# Patient Record
Sex: FEMALE | Race: WHITE | NOT HISPANIC OR LATINO | URBAN - METROPOLITAN AREA
[De-identification: names, ages, dates, MRNs, and addresses within clinical notes are randomized per-mention and may not be internally consistent; named-entity substitution may affect disease eponyms.]

---

## 2023-10-13 PROBLEM — H66.91 RIGHT OTITIS MEDIA: Status: ACTIVE | Noted: 2023-10-13

## 2023-10-13 PROBLEM — R55 SYNCOPE: Status: ACTIVE | Noted: 2023-10-13

## 2023-10-13 PROBLEM — F41.9 ANXIETY: Status: ACTIVE | Noted: 2023-10-13

## 2023-10-13 RX ORDER — BIOTIN 1 MG
TABLET ORAL
COMMUNITY

## 2023-10-13 RX ORDER — LEVOTHYROXINE SODIUM 100 UG/1
TABLET ORAL
COMMUNITY
Start: 2016-06-04

## 2023-10-13 RX ORDER — INSULIN LISPRO 100 [IU]/ML
INJECTION, SOLUTION INTRAVENOUS; SUBCUTANEOUS
COMMUNITY
Start: 2016-03-22

## 2023-10-13 RX ORDER — LEVOTHYROXINE SODIUM 112 UG/1
TABLET ORAL
COMMUNITY

## 2023-10-13 RX ORDER — ETONOGESTREL AND ETHINYL ESTRADIOL VAGINAL RING .015; .12 MG/D; MG/D
1 RING VAGINAL
COMMUNITY
Start: 2019-11-08

## 2023-10-13 RX ORDER — ESCITALOPRAM OXALATE 10 MG/1
10 TABLET ORAL DAILY
COMMUNITY
Start: 2020-03-06 | End: 2024-01-02

## 2023-10-13 RX ORDER — ACETAMINOPHEN 500 MG
50 TABLET ORAL DAILY
COMMUNITY
Start: 2020-10-16

## 2023-10-13 RX ORDER — INSULIN DETEMIR 100 [IU]/ML
INJECTION, SOLUTION SUBCUTANEOUS
COMMUNITY
Start: 2016-11-10

## 2023-10-16 ENCOUNTER — OFFICE VISIT (OUTPATIENT)
Dept: ORTHOPEDIC SURGERY | Facility: CLINIC | Age: 38
End: 2023-10-16
Payer: COMMERCIAL

## 2023-10-16 ENCOUNTER — ANCILLARY PROCEDURE (OUTPATIENT)
Dept: RADIOLOGY | Facility: CLINIC | Age: 38
End: 2023-10-16
Payer: COMMERCIAL

## 2023-10-16 DIAGNOSIS — M25.512 ACUTE PAIN OF LEFT SHOULDER: ICD-10-CM

## 2023-10-16 DIAGNOSIS — M75.42 IMPINGEMENT SYNDROME OF LEFT SHOULDER: ICD-10-CM

## 2023-10-16 PROCEDURE — 73030 X-RAY EXAM OF SHOULDER: CPT | Mod: LEFT SIDE | Performed by: RADIOLOGY

## 2023-10-16 PROCEDURE — 73030 X-RAY EXAM OF SHOULDER: CPT | Mod: LT,FY

## 2023-10-16 PROCEDURE — 99203 OFFICE O/P NEW LOW 30 MIN: CPT | Performed by: ORTHOPAEDIC SURGERY

## 2023-10-16 RX ORDER — MELOXICAM 15 MG/1
15 TABLET ORAL DAILY
Qty: 30 TABLET | Refills: 0 | Status: SHIPPED | OUTPATIENT
Start: 2023-10-16

## 2023-10-16 NOTE — PROGRESS NOTES
History of Present Illness:  Patient presents today endorsing left shoulder pain.  The pain is worse with overhead activity and tends to wake the patient at night.  The patient denies a traumatic injury.  The pain is sharp in nature, localizes lateral and deep.  Better with rest.  She works as a nurse began to play volleyball recently and attributes some of the discomfort to that.      No past medical history on file.  No past surgical history on file.    Current Outpatient Medications:     biotin 1 mg tablet, Take by mouth., Disp: , Rfl:     cholecalciferol (Vitamin D-3) 50 mcg (2,000 unit) capsule, Take 1 capsule (50 mcg) by mouth early in the morning.., Disp: , Rfl:     escitalopram (Lexapro) 10 mg tablet, Take 1 tablet (10 mg) by mouth once daily., Disp: , Rfl:     etonogestreL-ethinyl estradioL (NuvaRing) 0.12-0.015 mg/24 hr vaginal ring, Insert 1 each into the vagina.  for 3 weeks then remove for 1 week then repeat, Disp: , Rfl:     insulin detemir (Levemir FlexTouch U100 Insulin) 100 unit/mL (3 mL) pen, Inject under the skin., Disp: , Rfl:     insulin lispro (HumaLOG KwikPen Insulin) 100 unit/mL injection, Inject under the skin., Disp: , Rfl:     levothyroxine (Synthroid) 112 mcg tablet, Take by mouth. 1 by mouth every day: 1.5 On Sumdays LT4 112/D -Increased By DR Justin Dailye To 1.5 Tabs/Sunday, Disp: , Rfl:     levothyroxine (Synthroid, Levoxyl) 100 mcg tablet, Take by mouth., Disp: , Rfl:     meloxicam (Mobic) 15 mg tablet, Take 1 tablet (15 mg) by mouth once daily., Disp: 30 tablet, Rfl: 0    Review of Systems   GENERAL: Negative for malaise, significant weight loss, fever  MUSCULOSKELETAL: see HPI  NEURO:  Negative    Physical Examination:  Right Shoulder:    Skin healthy to gross inspection  No ecchymosis, no swelling, no gross atrophy  No tenderness to palpation over acromioclavicular joint  No tenderness to palpation over biceps tendon  No tenderness to palpation over the cervical spine     Range of  motion:  Full forward flexion  Full external rotation  IR to thoracic spine, symmetric to contralateral side  Strength:  4+/5 Resisted elevation  5/5 Resisted external rotation    Negative lift off test   Negative Spurling´s test  Positive Neer and Hawkin´s test  Neurovascular exam normal distally    Imaging:  Radiographs demonstrate healthy joint spaces with no evidence of significant degenerative changes or fractures    Assessment:  Patient with right shoulder pain, impingement versus rotator cuff pathology      Plan:  We discussed external impingement - reviewing acromial morphology and rotator cuff pathology.  The possibility of a partial or full-thickness or partial rotator cuff tear was discussed.  We discussed non-operative treatments (physical therapy, NSAIDs/risks, corticosteroid injections, and activity medication) and operative treatments (shoulder arthroscopy, acromioplasty, associated interventions, risks and benefits).  The patient elected for prescription strength anti-inflammatory, physical therapy.  MRI if she fails to improve..

## 2023-12-06 ENCOUNTER — EVALUATION (OUTPATIENT)
Dept: PHYSICAL THERAPY | Facility: CLINIC | Age: 38
End: 2023-12-06
Payer: COMMERCIAL

## 2023-12-06 DIAGNOSIS — M75.42 IMPINGEMENT SYNDROME OF LEFT SHOULDER: ICD-10-CM

## 2023-12-06 DIAGNOSIS — M25.612 SHOULDER STIFFNESS, LEFT: ICD-10-CM

## 2023-12-06 DIAGNOSIS — M25.512 LEFT SHOULDER PAIN, UNSPECIFIED CHRONICITY: Primary | ICD-10-CM

## 2023-12-06 PROCEDURE — 97161 PT EVAL LOW COMPLEX 20 MIN: CPT | Mod: GP | Performed by: PHYSICAL THERAPIST

## 2023-12-06 PROCEDURE — 97110 THERAPEUTIC EXERCISES: CPT | Mod: GP | Performed by: PHYSICAL THERAPIST

## 2023-12-06 ASSESSMENT — PAIN DESCRIPTION - DESCRIPTORS: DESCRIPTORS: ACHING

## 2023-12-06 ASSESSMENT — PAIN - FUNCTIONAL ASSESSMENT: PAIN_FUNCTIONAL_ASSESSMENT: 0-10

## 2023-12-06 ASSESSMENT — PAIN SCALES - GENERAL: PAINLEVEL_OUTOF10: 5 - MODERATE PAIN

## 2023-12-06 NOTE — PROGRESS NOTES
Physical Therapy    Physical Therapy Upper Extremity Evaluation    Patient Name: Yvonne Echeverria  MRN: [id number]  Today's Date: 12/6/2023  Time Calculation  Start Time: 1700  Stop Time: 1745  Time Calculation (min): 45 min    Current Problem  Problem List Items Addressed This Visit             ICD-10-CM    Left shoulder pain - Primary M25.512    Relevant Orders    Follow Up In Physical Therapy    Shoulder stiffness, left M25.612    Relevant Orders    Follow Up In Physical Therapy    Impingement syndrome of left shoulder M75.42    Relevant Orders    Follow Up In Physical Therapy        Precautions  Precautions  Precautions Comment: none     Pain  Pain Assessment: 0-10  Pain Score: 5 - Moderate pain  Pain Location: Shoulder  Pain Orientation: Left  Pain Descriptors: Aching  Pain Frequency: Intermittent    SUBJECTIVE:   Chief complaint:  Pt reports she had an onset of L shoulder pain after playing volleyball in late summer 2023. Notes pain with movement. Notes no pain at rest. Notes tightness in the shoulder and difficulty reaching/lifting/laying on the shoulder. Notes some weakness in the arm at this time. No prior hx of shoulder pain    Pain Better: rest, heat    Pain Worse: laying on it to sleep, reaching, lifting     Imaging: x-rays were negative     Prior level of function: was previously independent with reaching and lifting L UE and with all prior ADL's. R handed   Current limitations: lifting, reaching, laying on L shoulder     Home setup:reviewed an no concern     Work: nurse    Patients goal: better motion, reduce pain     Prior tx: no prior TX this yr.     OBJECTIVE:  Upper Extremity Strength:  MMT 5/5 max  RIGHT LEFT   Shoulder Flexion 5/5 4/5   Shoulder Abduction 5/5 4/5   Shoulder ER 5/5 4/5   Shoulder IR 5/5 4+/5   Elbow Flexion 5/5 5/5   Elbow Extension 5/5 5/5   Wrist Flexion     Wrist Extension       Upper Extremity ROM: WNL unless documented below:  ROM in Degrees RIGHT LEFT   Shoulder Flexion 172 128    Shoulder Abduction 170 104   Shoulder ER 70 52   Shoulder IR 65 40   Elbow Flexion WNL WNL   Elbow Extension WNL WNL   Wrist Flexion     Wrist Extension       Posture: forward rounded shoulders  Palpation:Mild tenderness to palpation anterior shoulder    Other Measures  Disability of Arm Shoulder Hand (DASH): Qdash 29.55    Special tests:  SHOULDER RIGHT LEFT   Hawkin's-Richard neg pos   Neer Impingement  neg pos   Drop arm neg neg   Infraspinatus muscle test neg pos   Lateral holger test neg pos   Empty can  neg Pos      TREATMENT:  Eval  2. Instruction in HEP:   Access Code: [email or web address]  URL: [email or web address]  Date: 12/06/2023  Prepared by:  Yovana    Exercises  - Shoulder Flexion Wall Slide with Towel (Mirrored)  - 1 x daily - 7 x weekly - 1 sets - 10 reps - 5 sec  hold  - Seated Shoulder Flexion Towel Slide at Table Top (Mirrored)  - 1 x daily - 7 x weekly - 1 sets - 10 reps - 5 sec  hold  - Seated Shoulder External Rotation PROM on Table  - 1 x daily - 7 x weekly - 1 sets - 10 reps - 5 sec  hold  - Seated Scapular Retraction  - 1 x daily - 7 x weekly - 2 sets - 10 reps  - Standing Shoulder Row with Anchored Resistance  - 1 x daily - 7 x weekly - 2 sets - 10 reps  - Shoulder extension with resistance - Neutral  - 1 x daily - 7 x weekly - 2 sets - 10 reps    ASSESSEMENT  Pt is a 38 y.o. referred to physical therapy for a dx of impingement syndrome L shoulder by Manny Almonte MD . Pt with signs and symptoms of pain, loss of motion, loss of strength, reduced function and reduced posture. This pt would benefit from a therapy program to restore prior level of function, reduce pain, increase AROM, increase strength, and improve posture.    The physical therapy prognosis is good for the patient to achieve their goals.   The pt tolerated therapy treatment today well with no adverse effects.  Barriers to therapy include:  Pt is diabetic Type 1    PLAN  The pt will be seen 1-2 days a week  for 6 weeks.      The pt has been educated about the risks and benefits of physical therapy including manual therapy treatments and gives consent for treatment.     The patient will benefit from physical therapy treatment to include: therapeutic exercises, therapeutic activities, neurological re-education, manual therapy, modalities, and a home exercise program.     Goals:  Active       PT Problem       Reduce pain at worst to 1-2/10 with all prior activity.        Start:  12/06/23    Expected End:  12/21/23            Pt to sit with good posture approx 50-75% of the time.        Start:  12/06/23    Expected End:  01/17/24            Pt to have increased shoulder and scapular strength by 1/2 to full MMT grade for improved ADL's, lifting and postural support.        Start:  12/06/23    Expected End:  01/17/24            Reduce pain at worst to 0/10 with all prior activity.        Start:  12/06/23    Expected End:  01/17/24            Increase shoulder AROM (in degrees) flex to 165, Abd 165, ER 90, IR 65 for lifting, ADL's, reaching and self care.        Start:  12/06/23    Expected End:  01/17/24            Pt to decrease quick DASH score by 10-15% to display overall improved shoulder function.        Start:  12/06/23    Expected End:  01/17/24            Pt to be independent with a HEP for self management.        Start:  12/06/23    Expected End:  01/17/24

## 2023-12-11 ENCOUNTER — TREATMENT (OUTPATIENT)
Dept: PHYSICAL THERAPY | Facility: CLINIC | Age: 38
End: 2023-12-11
Payer: COMMERCIAL

## 2023-12-11 DIAGNOSIS — M75.42 IMPINGEMENT SYNDROME OF LEFT SHOULDER: ICD-10-CM

## 2023-12-11 DIAGNOSIS — M25.612 SHOULDER STIFFNESS, LEFT: ICD-10-CM

## 2023-12-11 DIAGNOSIS — M25.512 LEFT SHOULDER PAIN, UNSPECIFIED CHRONICITY: ICD-10-CM

## 2023-12-11 PROCEDURE — 97110 THERAPEUTIC EXERCISES: CPT | Mod: GP,CQ

## 2023-12-11 PROCEDURE — 97140 MANUAL THERAPY 1/> REGIONS: CPT | Mod: GP,CQ

## 2023-12-11 ASSESSMENT — PAIN - FUNCTIONAL ASSESSMENT: PAIN_FUNCTIONAL_ASSESSMENT: 0-10

## 2023-12-11 ASSESSMENT — PAIN DESCRIPTION - DESCRIPTORS: DESCRIPTORS: ACHING

## 2023-12-11 ASSESSMENT — PAIN SCALES - GENERAL: PAINLEVEL_OUTOF10: 3

## 2023-12-11 NOTE — PROGRESS NOTES
Physical Therapy Treatment    Patient Name: Yvonne Echeverria  MRN: [id number]  Today's Date: 12/11/2023  Time Calculation  Start Time: 1705  Stop Time: 1745  Time Calculation (min): 40 min    Current Problem:  Problem List Items Addressed This Visit             ICD-10-CM    Left shoulder pain M25.512    Shoulder stiffness, left M25.612    Impingement syndrome of left shoulder M75.42       Subjective   General:   Pt reports shoulder is a bit more achy today. She performed HEP last night so that may be why.     Pain:  Pain Assessment: 0-10  Pain Score: 3  Pain Location: Shoulder  Pain Orientation: Left  Pain Descriptors: Aching  Pain Frequency: Intermittent    Precautions:  Precautions  Precautions Comment: none      Objective   No objective measures taken this visit    Treatment:  Therapeutic exercise  Pulley's (L) flex, scap x2 min ea   L IR with towel roll 5 sec x 10   Pec stretch in doorway 2 x 30 sec   L shoulder IR GTB 2x10   BL shoulder ER RTB 2x10   S/L L shoulder flex 2x10   S/L L shoulder abd 2x10  S/L L shoulder ER 2x10 2#  3 way posture iso 10 sec  x 3     Neuromuscular Re-education       Manual   PROM L shoulder all directions, GH mobs grade I-II   STM/TPR to L pec, ant delt    Modalities  Will ice at home if needed    Assessment   Pt tolerated therex well without complaints of pain, just fatigue.   Significant tightness in pecs, shoulders with 3 way posture. Pt not able to touch wall with UE's d/t tightness.   Significant tightness along anterior delt/pec with manual tx. Positive response with pt voicing relief with shoulder AROM following. Advised pt to ice if needed for residual soreness post tx.     Plan    Continue to progress POC as tolerated by patient to improve strength, mobility and overall function  Update HEP next visit  Goals:  Active       PT Problem       Reduce pain at worst to 1-2/10 with all prior activity.        Start:  12/06/23    Expected End:  12/21/23            Pt to sit with good  posture approx 50-75% of the time.        Start:  12/06/23    Expected End:  01/17/24            Pt to have increased shoulder and scapular strength by 1/2 to full MMT grade for improved ADL's, lifting and postural support.        Start:  12/06/23    Expected End:  01/17/24            Reduce pain at worst to 0/10 with all prior activity.        Start:  12/06/23    Expected End:  01/17/24            Increase shoulder AROM (in degrees) flex to 165, Abd 165, ER 90, IR 65 for lifting, ADL's, reaching and self care.        Start:  12/06/23    Expected End:  01/17/24            Pt to decrease quick DASH score by 10-15% to display overall improved shoulder function.        Start:  12/06/23    Expected End:  01/17/24            Pt to be independent with a HEP for self management.        Start:  12/06/23    Expected End:  01/17/24

## 2023-12-18 ENCOUNTER — TREATMENT (OUTPATIENT)
Dept: PHYSICAL THERAPY | Facility: CLINIC | Age: 38
End: 2023-12-18
Payer: COMMERCIAL

## 2023-12-18 DIAGNOSIS — M75.42 IMPINGEMENT SYNDROME OF LEFT SHOULDER: ICD-10-CM

## 2023-12-18 DIAGNOSIS — M25.612 SHOULDER STIFFNESS, LEFT: ICD-10-CM

## 2023-12-18 DIAGNOSIS — M25.512 LEFT SHOULDER PAIN, UNSPECIFIED CHRONICITY: ICD-10-CM

## 2023-12-18 PROCEDURE — 97140 MANUAL THERAPY 1/> REGIONS: CPT | Mod: GP,CQ

## 2023-12-18 PROCEDURE — 97110 THERAPEUTIC EXERCISES: CPT | Mod: GP,CQ

## 2023-12-18 ASSESSMENT — PAIN - FUNCTIONAL ASSESSMENT: PAIN_FUNCTIONAL_ASSESSMENT: 0-10

## 2023-12-18 ASSESSMENT — PAIN DESCRIPTION - DESCRIPTORS: DESCRIPTORS: ACHING

## 2023-12-18 NOTE — PROGRESS NOTES
Physical Therapy Treatment    Patient Name: Yvonne Echeverria  MRN: [id number]  Today's Date: 12/18/2023  Time Calculation  Start Time: 1700  Stop Time: 1750  Time Calculation (min): 50 min    Current Problem:  Problem List Items Addressed This Visit             ICD-10-CM    Left shoulder pain M25.512    Shoulder stiffness, left M25.612    Impingement syndrome of left shoulder M75.42       Subjective   General:   Pt's daughter jumped and pulled her arm back  and she experienced nauseating pain for about 10 min on Saturday. She had some lingering soreness. Overall still feeling overall improvement in shoulder.   Still limited with reaching behind her back.  She was sore after last session but it felt better the next day.     Pain:  Pain Assessment: 0-10  Pain Location: Shoulder  Pain Orientation: Left  Pain Descriptors: Aching  Pain Frequency: Intermittent    Precautions:  Precautions  Precautions Comment: none      Objective   No objective measures taken this visit    Treatment:  Therapeutic exercise  UBE x5 min fwd/retro   Pulley's (L) scap, IR  x2 min ea   L sleeper stretch 10 sec x 5   Pec stretch on GSB 2 x 30 sec   L shoulder IR GTB 2x10   BL shoulder ER RTB 2x10 with towel rolls for form   S/L L shoulder flex 2x10   S/L L shoulder abd 2x10  S/L L shoulder ER 2x10 2#  3 way posture iso 10 sec  x 3     Neuromuscular Re-education       Manual   PROM L shoulder all directions, GH mobs grade I-II -held  STM/TPR to L levator scap, supraspinatus    Modalities  CP to L shoulder x5 min (not billed)  Skin intact     Assessment   Pt tolerated therex well without complaints of pain, just fatigue.   Significant tightness in pecs, shoulders with 3 way posture. Pt not able to touch wall with UE's d/t tightness.   Significant tightness along anterior delt/pec with manual tx. Positive response with pt voicing relief with shoulder AROM following. Advised pt to ice if needed for residual soreness post tx.     Pt continues to display  significant tightness in pecs/shoulders with 3 way posture but noted some improvement vs previous session.   Improvement in IR AROM, able to bring L UE more midline vs previous session.   Positive response to manual, voicing less tightness at end range AROM.     Plan    Continue to progress POC as tolerated by patient to improve strength, mobility and overall function  Update HEP next visit  Goals:  Active       PT Problem       Reduce pain at worst to 1-2/10 with all prior activity.        Start:  12/06/23    Expected End:  12/21/23            Pt to sit with good posture approx 50-75% of the time.        Start:  12/06/23    Expected End:  01/17/24            Pt to have increased shoulder and scapular strength by 1/2 to full MMT grade for improved ADL's, lifting and postural support.        Start:  12/06/23    Expected End:  01/17/24            Reduce pain at worst to 0/10 with all prior activity.        Start:  12/06/23    Expected End:  01/17/24            Increase shoulder AROM (in degrees) flex to 165, Abd 165, ER 90, IR 65 for lifting, ADL's, reaching and self care.        Start:  12/06/23    Expected End:  01/17/24            Pt to decrease quick DASH score by 10-15% to display overall improved shoulder function.        Start:  12/06/23    Expected End:  01/17/24            Pt to be independent with a HEP for self management.        Start:  12/06/23    Expected End:  01/17/24

## 2023-12-27 ENCOUNTER — APPOINTMENT (OUTPATIENT)
Dept: PHYSICAL THERAPY | Facility: CLINIC | Age: 38
End: 2023-12-27
Payer: COMMERCIAL

## 2024-01-03 ENCOUNTER — OFFICE VISIT (OUTPATIENT)
Dept: PRIMARY CARE | Facility: CLINIC | Age: 39
End: 2024-01-03
Payer: COMMERCIAL

## 2024-01-03 ENCOUNTER — APPOINTMENT (OUTPATIENT)
Dept: PHYSICAL THERAPY | Facility: CLINIC | Age: 39
End: 2024-01-03
Payer: COMMERCIAL

## 2024-01-03 VITALS
OXYGEN SATURATION: 97 % | RESPIRATION RATE: 16 BRPM | SYSTOLIC BLOOD PRESSURE: 110 MMHG | DIASTOLIC BLOOD PRESSURE: 70 MMHG | TEMPERATURE: 96.6 F | HEART RATE: 80 BPM

## 2024-01-03 DIAGNOSIS — B37.31 VAGINAL YEAST INFECTION: ICD-10-CM

## 2024-01-03 DIAGNOSIS — E10.9 TYPE 1 DIABETES MELLITUS ON INSULIN THERAPY (MULTI): ICD-10-CM

## 2024-01-03 DIAGNOSIS — Z30.41 ORAL CONTRACEPTIVE USE: ICD-10-CM

## 2024-01-03 DIAGNOSIS — E55.9 VITAMIN D DEFICIENCY: ICD-10-CM

## 2024-01-03 DIAGNOSIS — H66.001 NON-RECURRENT ACUTE SUPPURATIVE OTITIS MEDIA OF RIGHT EAR WITHOUT SPONTANEOUS RUPTURE OF TYMPANIC MEMBRANE: Primary | ICD-10-CM

## 2024-01-03 DIAGNOSIS — Z78.9 USES BIRTH CONTROL: ICD-10-CM

## 2024-01-03 PROCEDURE — 99214 OFFICE O/P EST MOD 30 MIN: CPT | Performed by: FAMILY MEDICINE

## 2024-01-03 PROCEDURE — 3074F SYST BP LT 130 MM HG: CPT | Performed by: FAMILY MEDICINE

## 2024-01-03 PROCEDURE — 3078F DIAST BP <80 MM HG: CPT | Performed by: FAMILY MEDICINE

## 2024-01-03 RX ORDER — FLUCONAZOLE 150 MG/1
150 TABLET ORAL ONCE
Qty: 1 TABLET | Refills: 0 | Status: SHIPPED | OUTPATIENT
Start: 2024-01-03 | End: 2024-01-03

## 2024-01-03 RX ORDER — DOXYCYCLINE 100 MG/1
100 CAPSULE ORAL 2 TIMES DAILY
Qty: 28 CAPSULE | Refills: 0 | Status: SHIPPED | OUTPATIENT
Start: 2024-01-03 | End: 2024-01-17

## 2024-01-03 NOTE — PATIENT INSTRUCTIONS
KEEP ON INSULIN AND TAKE DOXYCYCLINE FOR RT EAR INFECTION.    FOR YEAST SYMPTOMS PLEASE USE FLUCONAZOLE.  CALL FOR WORSE SYMPTOMS:  [phone].    GET LABS DONE OR FORWARD FROM ENDOCRINOLOGY.   CAUTION ON HORMONE CONTROL AND ANTIBIOTICS CAN OVULATE.  USE SECONDARY PROTECTION OR ABSTAIN.

## 2024-01-08 ENCOUNTER — TREATMENT (OUTPATIENT)
Dept: PHYSICAL THERAPY | Facility: CLINIC | Age: 39
End: 2024-01-08
Payer: COMMERCIAL

## 2024-01-08 DIAGNOSIS — M25.612 SHOULDER STIFFNESS, LEFT: ICD-10-CM

## 2024-01-08 DIAGNOSIS — M75.42 IMPINGEMENT SYNDROME OF LEFT SHOULDER: Primary | ICD-10-CM

## 2024-01-08 DIAGNOSIS — M25.512 LEFT SHOULDER PAIN, UNSPECIFIED CHRONICITY: ICD-10-CM

## 2024-01-08 PROCEDURE — 97110 THERAPEUTIC EXERCISES: CPT | Mod: GP,CQ

## 2024-01-08 PROCEDURE — 97140 MANUAL THERAPY 1/> REGIONS: CPT | Mod: GP,CQ

## 2024-01-08 ASSESSMENT — PAIN SCALES - GENERAL: PAINLEVEL_OUTOF10: 0 - NO PAIN

## 2024-01-08 ASSESSMENT — PAIN - FUNCTIONAL ASSESSMENT: PAIN_FUNCTIONAL_ASSESSMENT: 0-10

## 2024-01-08 ASSESSMENT — PAIN DESCRIPTION - DESCRIPTORS: DESCRIPTORS: ACHING

## 2024-01-08 NOTE — PROGRESS NOTES
Physical Therapy Treatment    Patient Name: Yvonne Echeverria  MRN: [id number]  Today's Date: 1/8/2024  Time Calculation  Start Time: 1750  Stop Time: 1830  Time Calculation (min): 40 min    Current Problem:  Problem List Items Addressed This Visit             ICD-10-CM    Left shoulder pain M25.512    Shoulder stiffness, left M25.612    Impingement syndrome of left shoulder - Primary M75.42       Subjective   General:   Pt reports she was ill last week so was not very active. Shoulder has been feeling better. Still has a knot in shoulder but better.     Pain:  Pain Assessment: 0-10  Pain Score: 0 - No pain  Pain Location: Shoulder  Pain Orientation: Left  Pain Descriptors: Aching  Pain Frequency: Intermittent    Precautions:  Precautions  Precautions Comment: none      Objective   No objective measures taken this visit    Treatment:  Therapeutic exercise  UBE x5 min fwd/retro   Pulley's (L) scap, IR  x1 min ea   L sleeper stretch 10 sec x 5   Pec stretch on GSB 2 x 30 sec   L shoulder IR GTB 2x10   BL shoulder ER RTB 2x10 with towel rolls for form   S/L L shoulder flex 2x10 1#  S/L L shoulder abd 2x10 1#  S/L L shoulder ER 2x10 2#  3 way posture iso 10 sec  x 3   Mod RSB IYT x10     Neuromuscular Re-education       Manual   PROM L shoulder all directions, GH mobs grade I-II -held  STM/TPR to L levator scap, supraspinatus, UT     Modalities  Declined     Assessment   Pt progressing well, demonstrating increased strength with exercises today. Continues to display significant tightness in pecs/shoulders with 3 way posture exercise. TTP along L levator and UT with manual. No increased pain during or post tx.     Plan    Continue to progress POC as tolerated by patient to improve strength, mobility and overall function  Update HEP next visit  Goals:  Active       PT Problem       Reduce pain at worst to 1-2/10 with all prior activity.        Start:  12/06/23    Expected End:  12/21/23            Pt to sit with good posture  approx 50-75% of the time.        Start:  12/06/23    Expected End:  01/17/24            Pt to have increased shoulder and scapular strength by 1/2 to full MMT grade for improved ADL's, lifting and postural support.        Start:  12/06/23    Expected End:  01/17/24            Reduce pain at worst to 0/10 with all prior activity.        Start:  12/06/23    Expected End:  01/17/24            Increase shoulder AROM (in degrees) flex to 165, Abd 165, ER 90, IR 65 for lifting, ADL's, reaching and self care.        Start:  12/06/23    Expected End:  01/17/24            Pt to decrease quick DASH score by 10-15% to display overall improved shoulder function.        Start:  12/06/23    Expected End:  01/17/24            Pt to be independent with a HEP for self management.        Start:  12/06/23    Expected End:  01/17/24

## 2024-01-10 ENCOUNTER — TREATMENT (OUTPATIENT)
Dept: PHYSICAL THERAPY | Facility: CLINIC | Age: 39
End: 2024-01-10
Payer: COMMERCIAL

## 2024-01-10 DIAGNOSIS — M75.42 IMPINGEMENT SYNDROME OF LEFT SHOULDER: ICD-10-CM

## 2024-01-10 DIAGNOSIS — M25.512 LEFT SHOULDER PAIN, UNSPECIFIED CHRONICITY: Primary | ICD-10-CM

## 2024-01-10 DIAGNOSIS — M25.612 SHOULDER STIFFNESS, LEFT: ICD-10-CM

## 2024-01-10 PROCEDURE — 97110 THERAPEUTIC EXERCISES: CPT | Mod: GP | Performed by: PHYSICAL THERAPIST

## 2024-01-10 PROCEDURE — 97140 MANUAL THERAPY 1/> REGIONS: CPT | Mod: GP | Performed by: PHYSICAL THERAPIST

## 2024-01-10 ASSESSMENT — PAIN - FUNCTIONAL ASSESSMENT: PAIN_FUNCTIONAL_ASSESSMENT: 0-10

## 2024-01-10 NOTE — PROGRESS NOTES
Physical Therapy    Physical Therapy Treatment    Patient Name: Yvonne Echeverria  MRN: [id number]  Today's Date: 1/10/2024  Time Calculation  Start Time: 1746  Stop Time: 1831  Time Calculation (min): 45 min    Current Problem  Problem List Items Addressed This Visit             ICD-10-CM    Left shoulder pain - Primary M25.512    Shoulder stiffness, left M25.612    Impingement syndrome of left shoulder M75.42        Subjective   Pt reports she feels her shoulder is less painful overall but still getting tightness especially when trying to reach behind her back.   Compliance with HEP-  Precautions  Precautions  Precautions Comment: none    Pain  Pain Assessment: 0-10  Pain Score:  (3-4 at worst)  Pain Location: Shoulder  Pain Orientation: Left    Objective   No measures     Treatments:  Therapeutic exercise x 35 min   UBE x5 min fwd/retro   Pulley's (L) scap, IR  x1 min ea   L sleeper stretch 10 sec x 5   Pec stretch on GSB 2 x 30 sec   L shoulder IR GTB 2x10   BL shoulder ER RTB 2x10 with towel rolls for form   S/L L shoulder flex 2x10 2#  S/L L shoulder abd 2x10 2#  S/L L shoulder ER 2x10 2#  3 way posture iso 10 sec  x 3   Mod RSB IYT 2 x 10   AROM shoulder flex, scap and abd to 90 degrees 1# 2 x 10 ea    Manual x 10 min   PROM L shoulder all directions, GH mobs grade I-II -held  STM/TPR to L levator scap, supraspinatus, UT     Assessment:  Pt overall tolerated treatment well and noted with some tightness yet into IR of the shoulder. Pt overall improving well with her other motions and pain reduction thus far.     Plan:  Continue to progress ROM in the L shoulder in 2 weeks     Goals:  Active       PT Problem       Reduce pain at worst to 1-2/10 with all prior activity.        Start:  12/06/23    Expected End:  12/21/23            Pt to sit with good posture approx 50-75% of the time.        Start:  12/06/23    Expected End:  01/17/24            Pt to have increased shoulder and scapular strength by 1/2 to full MMT  grade for improved ADL's, lifting and postural support.        Start:  12/06/23    Expected End:  01/17/24            Reduce pain at worst to 0/10 with all prior activity.        Start:  12/06/23    Expected End:  01/17/24            Increase shoulder AROM (in degrees) flex to 165, Abd 165, ER 90, IR 65 for lifting, ADL's, reaching and self care.        Start:  12/06/23    Expected End:  01/17/24            Pt to decrease quick DASH score by 10-15% to display overall improved shoulder function.        Start:  12/06/23    Expected End:  01/17/24            Pt to be independent with a HEP for self management.        Start:  12/06/23    Expected End:  01/17/24

## 2024-01-22 ENCOUNTER — TREATMENT (OUTPATIENT)
Dept: PHYSICAL THERAPY | Facility: CLINIC | Age: 39
End: 2024-01-22
Payer: COMMERCIAL

## 2024-01-22 DIAGNOSIS — M25.512 LEFT SHOULDER PAIN, UNSPECIFIED CHRONICITY: Primary | ICD-10-CM

## 2024-01-22 DIAGNOSIS — M25.612 SHOULDER STIFFNESS, LEFT: ICD-10-CM

## 2024-01-22 DIAGNOSIS — M75.42 IMPINGEMENT SYNDROME OF LEFT SHOULDER: ICD-10-CM

## 2024-01-22 PROCEDURE — 97140 MANUAL THERAPY 1/> REGIONS: CPT | Mod: GP | Performed by: PHYSICAL THERAPIST

## 2024-01-22 PROCEDURE — 97110 THERAPEUTIC EXERCISES: CPT | Mod: GP | Performed by: PHYSICAL THERAPIST

## 2024-01-22 ASSESSMENT — PAIN - FUNCTIONAL ASSESSMENT: PAIN_FUNCTIONAL_ASSESSMENT: 0-10

## 2024-01-22 ASSESSMENT — PAIN SCALES - GENERAL: PAINLEVEL_OUTOF10: 1

## 2024-01-22 NOTE — PROGRESS NOTES
Physical Therapy    Physical Therapy Treatment    Patient Name: Yvonne Echeverria  MRN: [id number]  Today's Date: 1/22/2024  Time Calculation  Start Time: 1700  Stop Time: 1745  Time Calculation (min): 45 min  Payor: [employer] / Plan: [employer] / Product Type: *No Product type* /     Current Problem  Problem List Items Addressed This Visit             ICD-10-CM    Left shoulder pain - Primary M25.512    Shoulder stiffness, left M25.612    Impingement syndrome of left shoulder M75.42        Subjective   Pt reports still majority of pain in the AM upon waking as she still sleeps on that shoulder.   Compliance with HEP-yes     Precautions  Precautions  Precautions Comment: none    Pain  Pain Assessment: 0-10  Pain Score: 1      Objective   No measures today     Treatments:  Therapeutic exercise x 35 min   UBE x5 min fwd/retro   Pulley's (L) scap, IR  x1 min ea   L sleeper stretch 10 sec x 5   Pec stretch on GSB 2 x 30 sec   L shoulder IR GTB 2x10   BL shoulder ER RTB 2x10 with towel rolls for form   S/L L shoulder flex 2x10 2#  S/L L shoulder abd 2x10 2#  S/L L shoulder ER 2x10 2#  3 way posture iso 10 sec  x 3   Mod RSB IYT 2 x 10   AROM shoulder flex, scap and abd to 90 degrees 1# 2 x 10 ea  Shoulder flex with Red ball (6 to 12) L UE 2 x 10  Shoulder abd at wall red ball ( 7 to 11) L UE 2 x 10    Manual x 10 min   PROM L shoulder all directions, GH mobs grade I-II -held  STM/TPR to L levator scap, supraspinatus, UT     Assessment:  Pt overall tolerated tx well and progress with strengthening program today. Overall still improving with therapy but still noted with some terminal tightness in flexion and IR of the shoulder.     Plan:  Continue a few more visit to progress to full ROM in the shoulder.     Goals:  Active       PT Problem       Reduce pain at worst to 1-2/10 with all prior activity.  (Progressing)       Start:  12/06/23    Expected End:  12/21/23            Pt to sit with good posture  approx 50-75% of the time.  (Met)       Start:  12/06/23    Expected End:  01/17/24    Resolved:  01/22/24         Pt to have increased shoulder and scapular strength by 1/2 to full MMT grade for improved ADL's, lifting and postural support.  (Progressing)       Start:  12/06/23    Expected End:  01/17/24            Reduce pain at worst to 0/10 with all prior activity.  (Progressing)       Start:  12/06/23    Expected End:  01/17/24            Increase shoulder AROM (in degrees) flex to 165, Abd 165, ER 90, IR 65 for lifting, ADL's, reaching and self care.  (Progressing)       Start:  12/06/23    Expected End:  01/17/24            Pt to decrease quick DASH score by 10-15% to display overall improved shoulder function.  (Progressing)       Start:  12/06/23    Expected End:  01/17/24            Pt to be independent with a HEP for self management.  (Progressing)       Start:  12/06/23    Expected End:  01/17/24

## 2024-02-05 ENCOUNTER — DOCUMENTATION (OUTPATIENT)
Dept: PHYSICAL THERAPY | Facility: CLINIC | Age: 39
End: 2024-02-05

## 2024-02-05 ENCOUNTER — APPOINTMENT (OUTPATIENT)
Dept: PHYSICAL THERAPY | Facility: CLINIC | Age: 39
End: 2024-02-05
Payer: COMMERCIAL

## 2024-02-05 NOTE — PROGRESS NOTES
Physical Therapy                 Therapy Communication Note    Patient Name: Yvonne Echeverria  MRN: [id number]  Today's Date: 2/5/2024     Discipline: Physical Therapy    Missed Visit Reason:      Missed Time: Cancel    Comment: Pt cx appt via my chart

## 2024-02-05 NOTE — PROGRESS NOTES
Physical Therapy Treatment    Patient Name: Yvonne Echeverria  MRN: [id number]  Today's Date: 2/5/2024     Payor: [employer] [facility] / Plan: [employer] / Product Type: *No Product type* /     Current Problem:  Problem List Items Addressed This Visit    None      Subjective   General:       Pain:     Pain location: ***    Precautions:         Objective   No objective measures taken this visit    Treatment:  Therapeutic exercise x 35 min   UBE x5 min fwd/retro   Pulley's (L) scap, IR  x1 min ea   L sleeper stretch 10 sec x 5   Pec stretch on GSB 2 x 30 sec   L shoulder IR GTB 2x10   BL shoulder ER RTB 2x10 with towel rolls for form   S/L L shoulder flex 2x10 2#  S/L L shoulder abd 2x10 2#  S/L L shoulder ER 2x10 2#  3 way posture iso 10 sec  x 3   Mod RSB IYT 2 x 10   AROM shoulder flex, scap and abd to 90 degrees 1# 2 x 10 ea  Shoulder flex with Red ball (6 to 12) L UE 2 x 10  Shoulder abd at wall red ball ( 7 to 11) L UE 2 x 10    Manual x 10 min   PROM L shoulder all directions, GH mobs grade I-II -held  STM/TPR to L levator scap, supraspinatus, UT       Assessment   Pt overall tolerated tx well and progress with strengthening program today. Overall still improving with therapy but still noted with some terminal tightness in flexion and IR of the shoulder.     Plan    Continue to progress POC as tolerated by patient to improve strength, mobility and overall function    Goals:  Active       PT Problem       Reduce pain at worst to 1-2/10 with all prior activity.  (Progressing)       Start:  12/06/23    Expected End:  12/21/23            Pt to sit with good posture approx 50-75% of the time.  (Met)       Start:  12/06/23    Expected End:  01/17/24    Resolved:  01/22/24         Pt to have increased shoulder and scapular strength by 1/2 to full MMT grade for improved ADL's, lifting and postural support.  (Progressing)       Start:  12/06/23    Expected End:  01/17/24            Reduce pain at worst to 0/10  with all prior activity.  (Progressing)       Start:  12/06/23    Expected End:  01/17/24            Increase shoulder AROM (in degrees) flex to 165, Abd 165, ER 90, IR 65 for lifting, ADL's, reaching and self care.  (Progressing)       Start:  12/06/23    Expected End:  01/17/24            Pt to decrease quick DASH score by 10-15% to display overall improved shoulder function.  (Progressing)       Start:  12/06/23    Expected End:  01/17/24            Pt to be independent with a HEP for self management.  (Progressing)       Start:  12/06/23    Expected End:  01/17/24

## 2024-02-12 ENCOUNTER — TREATMENT (OUTPATIENT)
Dept: PHYSICAL THERAPY | Facility: CLINIC | Age: 39
End: 2024-02-12
Payer: COMMERCIAL

## 2024-02-12 DIAGNOSIS — M75.42 IMPINGEMENT SYNDROME OF LEFT SHOULDER: ICD-10-CM

## 2024-02-12 DIAGNOSIS — M25.612 SHOULDER STIFFNESS, LEFT: ICD-10-CM

## 2024-02-12 DIAGNOSIS — M25.512 LEFT SHOULDER PAIN, UNSPECIFIED CHRONICITY: Primary | ICD-10-CM

## 2024-02-12 PROCEDURE — 97110 THERAPEUTIC EXERCISES: CPT | Mod: GP | Performed by: PHYSICAL THERAPIST

## 2024-02-12 PROCEDURE — 97140 MANUAL THERAPY 1/> REGIONS: CPT | Mod: GP | Performed by: PHYSICAL THERAPIST

## 2024-02-12 ASSESSMENT — PAIN - FUNCTIONAL ASSESSMENT: PAIN_FUNCTIONAL_ASSESSMENT: 0-10

## 2024-02-12 ASSESSMENT — PAIN SCALES - GENERAL: PAINLEVEL_OUTOF10: 1

## 2024-02-12 NOTE — PROGRESS NOTES
Physical Therapy    Physical Therapy Treatment    Patient Name: Yvonne Echeverria  MRN: [id number]  Today's Date: 2/12/2024  Time Calculation  Start Time: 1701  Stop Time: 1744  Time Calculation (min): 43 min  Payor: MEDICAL [facility] / Plan: [employer]ICAL [employer] / Product Type: *No Product type* /     General Comment: Visit 7 of 30    Current Problem    Problem List Items Addressed This Visit             ICD-10-CM    Left shoulder pain - Primary M25.512    Shoulder stiffness, left M25.612    Impingement syndrome of left shoulder M75.42        Subjective   Pt reports L shoulder causes discomfort in AM and with sleeping on L shoulder. Pt notes no trouble with reaching forward and lifting, but still lacks motion with reaching behind back with L shoulder. Pt notes pain at worst when overworking L shoulder to be 2-3/10.  Compliance with HEP- yes  Precautions  Precautions  Precautions Comment: none    Pain  Pain Assessment: 0-10  Pain Score: 1  Pain Location: Shoulder  Pain Orientation: Left    Objective   No measures.     Treatments:  Therapeutic exercise x 35 min   UBE x5 min fwd/retro   Pulley's (L) scap, IR  x1 min ea   L sleeper stretch 10 sec x 5   Pec stretch on GSB 2 x 30 sec   L shoulder IR GTB 2x10   BL shoulder ER GTB 2x10 with towel rolls for form   S/L L shoulder flex 2x10 2#  S/L L shoulder abd 2x10 2#  S/L L shoulder ER 2x10 2#  3 way posture iso 10 sec  x 3   Mod RSB IYT 2 x 10   AROM shoulder flex, scap and abd to 90 degrees 2# 2 x 10 ea  Shoulder flex with Red ball (6 to 12) L UE 2 x 10  Shoulder abd at wall red ball ( 7 to 11) L UE 2 x 10    Manual x 8 min   PROM L shoulder all directions  GH mobs grade I-II -held  STM/TPR to L levator scap, supraspinatus  Assessment:   Pt tolerated today's session well with progression of exercise with increased resistance and weight with exercise. Pt experienced increased muscle fatigue in L shoulder at end of session. Pt responded well to manual therapy with  little to no discomfort reported and increased mobility.     Plan:   Re-check next visit.    Goals:  Active       PT Problem       Reduce pain at worst to 1-2/10 with all prior activity.  (Progressing)       Start:  12/06/23    Expected End:  12/21/23            Pt to sit with good posture approx 50-75% of the time.  (Met)       Start:  12/06/23    Expected End:  01/17/24    Resolved:  01/22/24         Pt to have increased shoulder and scapular strength by 1/2 to full MMT grade for improved ADL's, lifting and postural support.  (Progressing)       Start:  12/06/23    Expected End:  01/17/24            Reduce pain at worst to 0/10 with all prior activity.  (Progressing)       Start:  12/06/23    Expected End:  01/17/24       progressing         Increase shoulder AROM (in degrees) flex to 165, Abd 165, ER 90, IR 65 for lifting, ADL's, reaching and self care.  (Progressing)       Start:  12/06/23    Expected End:  01/17/24            Pt to decrease quick DASH score by 10-15% to display overall improved shoulder function.  (Progressing)       Start:  12/06/23    Expected End:  01/17/24            Pt to be independent with a HEP for self management.  (Progressing)       Start:  12/06/23    Expected End:  01/17/24             Note signed by Mary Schumacher, SPT

## 2024-02-14 ENCOUNTER — TREATMENT (OUTPATIENT)
Dept: PHYSICAL THERAPY | Facility: CLINIC | Age: 39
End: 2024-02-14
Payer: COMMERCIAL

## 2024-02-14 DIAGNOSIS — M25.612 SHOULDER STIFFNESS, LEFT: ICD-10-CM

## 2024-02-14 DIAGNOSIS — M75.42 IMPINGEMENT SYNDROME OF LEFT SHOULDER: ICD-10-CM

## 2024-02-14 DIAGNOSIS — M25.512 LEFT SHOULDER PAIN, UNSPECIFIED CHRONICITY: Primary | ICD-10-CM

## 2024-02-14 PROCEDURE — 97140 MANUAL THERAPY 1/> REGIONS: CPT | Mod: GP | Performed by: PHYSICAL THERAPIST

## 2024-02-14 PROCEDURE — 97110 THERAPEUTIC EXERCISES: CPT | Mod: GP | Performed by: PHYSICAL THERAPIST

## 2024-02-14 ASSESSMENT — PAIN - FUNCTIONAL ASSESSMENT: PAIN_FUNCTIONAL_ASSESSMENT: 0-10

## 2024-02-14 ASSESSMENT — PAIN SCALES - GENERAL: PAINLEVEL_OUTOF10: 2

## 2024-02-14 NOTE — PROGRESS NOTES
Physical Therapy    Physical Therapy Treatment    Patient Name: Yvonne Echeverria  MRN: [id number]  Today's Date: 2/14/2024  Time Calculation  Start Time: 1745  Stop Time: 1830  Time Calculation (min): 45 min  Payor: MEDICAL [facility] / Plan: [employer]ICAL [employer] / Product Type: *No Product type* /     General Comment: Visit 8 of 30    Current Problem    Problem List Items Addressed This Visit             ICD-10-CM    Left shoulder pain - Primary M25.512    Shoulder stiffness, left M25.612    Impingement syndrome of left shoulder M75.42        Subjective   Pt notes increased left shoulder discomfort describing as an ache. 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 Pt notes increase in pain score to 2/10 at beginning of session.   Compliance with HEP- yes  Precautions  Precautions  Precautions Comment: none    Pain  Pain Assessment: 0-10  Pain Score: 2  Pain Location: Shoulder  Pain Orientation: Left    Objective   Upper Extremity Strength:  MMT 5/5 max  RIGHT LEFT   Shoulder Flexion 5/5 4+/5   Shoulder Abduction 5/5 4+/5   Shoulder ER 5/5 5/5   Shoulder IR 5/5 5/5   Elbow Flexion 5/5 5/5   Elbow Extension 5/5 5/5   Wrist Flexion     Wrist Extension       Upper Extremity ROM: WNL unless documented below:  ROM in Degrees RIGHT LEFT   Shoulder Flexion 172 150   Shoulder Abduction 170 155   Shoulder ER 70 65   Shoulder IR 65 50; T12   Elbow Flexion WNL WNL   Elbow Extension WNL WNL   Wrist Flexion     Wrist Extension       Posture: forward rounded shoulders  Palpation: No TPP    Treatments:  Therapeutic exercise x 35 min   UBE x5 min fwd/retro   Pulley's (L) scap, IR  x1 min ea   L sleeper stretch 10 sec x 5   Pec stretch on GSB 2 x 30 sec   L shoulder IR GTB 2x10   BL shoulder ER GTB 2x10 with towel rolls for form   S/L L shoulder flex 2x10 2#  S/L L shoulder abd 2x10 2#  S/L L shoulder ER 2x10 2#  3 way posture iso 10 sec  x 3   Mod  RSB IYT, ext 2 x 10   Mod RSB ROW 2x10  1#  AROM shoulder flex, scap and abd to 90 degrees 2# 2 x 10 ea  Shoulder flex with Red ball (6 to 12) L UE 2 x 10  Shoulder abd at wall red ball ( 7 to 11) L UE 2 x 10    Manual x 10 min   PROM L shoulder all directions  GH mobs grade I-II -held  STM/TPR to L levator scap, supraspinatus  Assessment:    Re-assessed objective measures, pt demonstrates improved L shoulder AROM and improved strength. Pt required slight verbal cueing for proper body mechanics with shoulder ABD at wall with red ball. Pt challenged by today's progression of exercise with increased weight and strengthening exercise, experiencing muscle soreness and fatigue. Pt responded well to manual treatment with reports of decreased L shoulder discomfort.     Plan:   Continue with shoulder and scapular strengthening exercise and progress by adding wall clocks with resistance and increase resistance/weight as tolerated.      Goals:  Active       PT Problem       Reduce pain at worst to 1-2/10 with all prior activity.  (Progressing)       Start:  12/06/23    Expected End:  12/21/23            Pt to sit with good posture approx 50-75% of the time.  (Met)       Start:  12/06/23    Expected End:  01/17/24    Resolved:  01/22/24         Pt to have increased shoulder and scapular strength by 1/2 to full MMT grade for improved ADL's, lifting and postural support.  (Progressing)       Start:  12/06/23    Expected End:  01/17/24            Reduce pain at worst to 0/10 with all prior activity.  (Progressing)       Start:  12/06/23    Expected End:  01/17/24       progressing         Increase shoulder AROM (in degrees) flex to 165, Abd 165, ER 90, IR 65 for lifting, ADL's, reaching and self care.  (Progressing)       Start:  12/06/23    Expected End:  01/17/24            Pt to decrease quick DASH score by 10-15% to display overall improved shoulder function.  (Progressing)       Start:  12/06/23    Expected End:  01/17/24             Pt to be independent with a HEP for self management.  (Progressing)       Start:  12/06/23    Expected End:  01/17/24             Note signed by KANCHAN Gay.

## 2024-03-04 ENCOUNTER — TREATMENT (OUTPATIENT)
Dept: PHYSICAL THERAPY | Facility: CLINIC | Age: 39
End: 2024-03-04
Payer: COMMERCIAL

## 2024-03-04 DIAGNOSIS — M25.512 LEFT SHOULDER PAIN, UNSPECIFIED CHRONICITY: ICD-10-CM

## 2024-03-04 DIAGNOSIS — M75.42 IMPINGEMENT SYNDROME OF LEFT SHOULDER: Primary | ICD-10-CM

## 2024-03-04 DIAGNOSIS — M25.612 SHOULDER STIFFNESS, LEFT: ICD-10-CM

## 2024-03-04 PROCEDURE — 97110 THERAPEUTIC EXERCISES: CPT | Mod: GP,CQ

## 2024-03-04 ASSESSMENT — PAIN - FUNCTIONAL ASSESSMENT: PAIN_FUNCTIONAL_ASSESSMENT: 0-10

## 2024-03-04 ASSESSMENT — PAIN SCALES - GENERAL: PAINLEVEL_OUTOF10: 0 - NO PAIN

## 2024-03-04 NOTE — PROGRESS NOTES
Physical Therapy    Physical Therapy Treatment    Patient Name: Yvonne Echeverria  MRN: [id number]  Today's Date: 3/4/2024  Time Calculation  Start Time: 1745  Stop Time: 1830  Time Calculation (min): 45 min  Payor: MEDICAL [facility] / Plan: [employer]ICAL [employer] / Product Type: *No Product type* /     General Comment: Visit 9 of 30    Current Problem    Problem List Items Addressed This Visit             ICD-10-CM    Left shoulder pain M25.512    Shoulder stiffness, left M25.612    Impingement syndrome of left shoulder - Primary M75.42          Subjective   Pt reports L shoulder is not as achy as it was.   Shoulder achy in the AM when she wakes up on it.  Precautions  Precautions  Precautions Comment: none    Pain  Pain Assessment: 0-10  Pain Score: 0 - No pain  Pain Location: Shoulder  Pain Orientation: Left    Objective   Upper Extremity Strength:  MMT 5/5 max  RIGHT LEFT   Shoulder Flexion 5/5 4+/5   Shoulder Abduction 5/5 4+/5   Shoulder ER 5/5 5/5   Shoulder IR 5/5 5/5   Elbow Flexion 5/5 5/5   Elbow Extension 5/5 5/5   Wrist Flexion     Wrist Extension       Upper Extremity ROM: WNL unless documented below:  ROM in Degrees RIGHT LEFT   Shoulder Flexion 172 150   Shoulder Abduction 170 155   Shoulder ER 70 65   Shoulder IR 65 50; T12   Elbow Flexion WNL WNL   Elbow Extension WNL WNL   Wrist Flexion     Wrist Extension       Posture: forward rounded shoulders  Palpation: No TPP    Treatments:  Therapeutic exercise x 40 min   UBE x5 min fwd/retro   Pulley's (L) scap, IR  x1 min ea   L sleeper stretch 10 sec x 5   Pec stretch on GSB 2 x 30 sec   L shoulder IR BTB 2x10   L shoulder ER OTB walkout x12  S/L L shoulder flex 2x10 2#  S/L L shoulder abd 2x10 2#  S/L L shoulder ER 2x10 2#  3 way posture iso 10 sec  x 3   Mod RSB IYT, ext 2 x 10 1#  Mod RSB ROW 2x10  1#  AROM shoulder flex, scap and abd to 90 degrees 2# 2 x 10 ea  Shoulder flex with Red ball (6 to 12) L UE 2 x 10  Shoulder abd at wall red ball ( 7 to  11) L UE 2 x 10    Manual x 5 min   PROM L shoulder all directions  GH mobs grade I-II -held  STM/TPR to L levator scap, supraspinatus  Assessment:    Pt tolerated tx well without complaints of shoulder pain. Required frequent verbal and tactile cues to relax shoulders/UT's and to isolate appropriate scap muscles during IYT. No pain post tx.     Plan:   Recheck next session    Access Code: [email or web address]  URL: [email or web address]  Date: 03/04/2024  Prepared by: Angeline Johnson    Exercises  - Doorway Pec Stretch at 90 Degrees Abduction  - 1 x daily - 7 x weekly - 2 sets - 30 sec-1 min hold  - Shoulder Flexion Wall Slide with Towel (Mirrored)  - 1 x daily - 7 x weekly - 1 sets - 10 reps - 5 sec  hold  - Standing Shoulder Row with Anchored Resistance  - 1 x daily - 7 x weekly - 2 sets - 10 reps  - Shoulder extension with resistance - Neutral  - 1 x daily - 7 x weekly - 2 sets - 10 reps  - Standing Shoulder Internal Rotation AAROM Behind Back with Towel   - 1 x daily - 7 x weekly - 1 sets - 10 reps - 5-10 sec hold  - Standing Shoulder Internal Rotation Stretch with Towel  - 1 x daily - 7 x weekly - 2 sets - 30 sec  hold  - Shoulder External Rotation and Scapular Retraction with Resistance  - 1 x daily - 7 x weekly - 2-3 sets - 10 reps      Goals:  Active       PT Problem       Reduce pain at worst to 1-2/10 with all prior activity.  (Progressing)       Start:  12/06/23    Expected End:  12/21/23            Pt to sit with good posture approx 50-75% of the time.  (Met)       Start:  12/06/23    Expected End:  01/17/24    Resolved:  01/22/24         Pt to have increased shoulder and scapular strength by 1/2 to full MMT grade for improved ADL's, lifting and postural support.  (Progressing)       Start:  12/06/23    Expected End:  01/17/24            Reduce pain at worst to 0/10 with all prior activity.  (Progressing)       Start:  12/06/23    Expected End:  01/17/24       progressing         Increase  shoulder AROM (in degrees) flex to 165, Abd 165, ER 90, IR 65 for lifting, ADL's, reaching and self care.  (Progressing)       Start:  12/06/23    Expected End:  01/17/24            Pt to decrease quick DASH score by 10-15% to display overall improved shoulder function.  (Progressing)       Start:  12/06/23    Expected End:  01/17/24            Pt to be independent with a HEP for self management.  (Progressing)       Start:  12/06/23    Expected End:  01/17/24             Note signed by Mary Schumacher, SPT.

## 2024-04-01 DIAGNOSIS — F41.9 ANXIETY: ICD-10-CM

## 2024-04-01 RX ORDER — ESCITALOPRAM OXALATE 10 MG/1
10 TABLET ORAL DAILY
Qty: 90 TABLET | Refills: 3 | Status: SHIPPED | OUTPATIENT
Start: 2024-04-01

## 2024-05-09 ENCOUNTER — DOCUMENTATION (OUTPATIENT)
Dept: PHYSICAL THERAPY | Facility: CLINIC | Age: 39
End: 2024-05-09
Payer: COMMERCIAL

## 2024-05-09 NOTE — PROGRESS NOTES
Physical Therapy    Discharge Summary    Name: Yvonne Echeverria  MRN: [id number]  : 1985  Date: 24    Discharge Summary: PT    Discharge Information: Date of discharge 24, Date of last visit 3/4/24, Date of evaluation 23, Number of attended visits 9, Referred by Manny Almonte, and Referred for L shoulder pain     Therapy Summary: Pt received manual tx, there-ex, neuro re-ed and HEP for 9 visits in therapy with improvements noted.     Discharge Status: Pt was seen for 9 visits in PT and had improvements in her shoulder pain. Pt however has not returned to active therapy    Rehab Discharge Reason: Failed to schedule and/or keep follow-up appointment(s)

## 2024-06-22 DIAGNOSIS — E10.9 TYPE 1 DIABETES MELLITUS ON INSULIN THERAPY (MULTI): Primary | ICD-10-CM

## 2024-06-24 RX ORDER — INSULIN LISPRO 100 [IU]/ML
INJECTION, SOLUTION INTRAVENOUS; SUBCUTANEOUS
Qty: 30 ML | Refills: 1 | Status: SHIPPED | OUTPATIENT
Start: 2024-06-24

## 2024-07-05 ENCOUNTER — OFFICE VISIT (OUTPATIENT)
Dept: PRIMARY CARE | Facility: CLINIC | Age: 39
End: 2024-07-05
Payer: COMMERCIAL

## 2024-07-05 VITALS
DIASTOLIC BLOOD PRESSURE: 70 MMHG | HEIGHT: 61 IN | HEART RATE: 90 BPM | OXYGEN SATURATION: 98 % | TEMPERATURE: 97.1 F | WEIGHT: 205.2 LBS | RESPIRATION RATE: 18 BRPM | BODY MASS INDEX: 38.74 KG/M2 | SYSTOLIC BLOOD PRESSURE: 124 MMHG

## 2024-07-05 DIAGNOSIS — J06.9 UPPER RESPIRATORY TRACT INFECTION, UNSPECIFIED TYPE: Primary | ICD-10-CM

## 2024-07-05 PROCEDURE — 99213 OFFICE O/P EST LOW 20 MIN: CPT | Performed by: INTERNAL MEDICINE

## 2024-07-05 PROCEDURE — 1036F TOBACCO NON-USER: CPT | Performed by: INTERNAL MEDICINE

## 2024-07-05 RX ORDER — DOXYCYCLINE 100 MG/1
100 CAPSULE ORAL 2 TIMES DAILY
Qty: 14 CAPSULE | Refills: 0 | Status: SHIPPED | OUTPATIENT
Start: 2024-07-05 | End: 2024-07-12

## 2024-07-05 NOTE — PROGRESS NOTES
Shreyas Echeverria is a 38 y.o. female who presents for evaluation of symptoms of a URI. Symptoms include achiness, congestion, coryza, nasal congestion, no  fever, productive cough with  white and yellow colored sputum, and sore throat. Onset of symptoms was 3 days ago and has been unchanged since that time. 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   Objective   Physical Exam  Constitutional:       Appearance: Normal appearance.   HENT:      Right Ear: Tympanic membrane normal.      Left Ear: Tympanic membrane normal.      Mouth/Throat:      Mouth: Mucous membranes are dry.      Pharynx: Oropharynx is clear. No oropharyngeal exudate or posterior oropharyngeal erythema.   Eyes:      Conjunctiva/sclera: Conjunctivae normal.   Cardiovascular:      Rate and Rhythm: Normal rate and regular rhythm.      Heart sounds: Normal heart sounds.   Pulmonary:      Effort: Pulmonary effort is normal.      Breath sounds: Normal breath sounds. No wheezing.   Neurological:      Mental Status: She is alert.          Assessment/Plan   viral upper respiratory illness.    Discussed diagnosis and treatment of URI.  Suggested symptomatic OTC remedies.  Nasal saline spray for congestion.  Follow up as needed.  Doxycycline 100  mg po bid # 14 ( allergy to Augmentin, and does not want Z pack )  Contact precautions

## 2024-08-15 ENCOUNTER — TELEPHONE (OUTPATIENT)
Dept: PRIMARY CARE | Facility: CLINIC | Age: 39
End: 2024-08-15
Payer: COMMERCIAL

## 2024-08-15 NOTE — TELEPHONE ENCOUNTER
----- Message from Janak Nunez sent at 8/14/2024  6:09 PM EDT -----  Regarding: OK MAMMO  OK MAMMO; REPEAT AUGUST 2025.  ----- Message -----  From: Nicole Green MA  Sent: 8/14/2024   9:59 AM EDT  To: Janak Nunez MD

## 2024-08-24 DIAGNOSIS — E10.9 TYPE 1 DIABETES MELLITUS ON INSULIN THERAPY (MULTI): ICD-10-CM

## 2024-08-26 RX ORDER — INSULIN LISPRO 100 [IU]/ML
INJECTION, SOLUTION INTRAVENOUS; SUBCUTANEOUS
Qty: 30 ML | Refills: 1 | Status: SHIPPED | OUTPATIENT
Start: 2024-08-26

## 2024-09-06 DIAGNOSIS — E10.9 TYPE 1 DIABETES MELLITUS ON INSULIN THERAPY (MULTI): ICD-10-CM

## 2024-09-06 RX ORDER — INSULIN LISPRO 100 [IU]/ML
INJECTION, SOLUTION INTRAVENOUS; SUBCUTANEOUS
Qty: 90 ML | Refills: 3 | Status: SHIPPED | OUTPATIENT
Start: 2024-09-06

## 2024-09-06 NOTE — PROGRESS NOTES
Subjective   Patient ID: Yvonne Echeverria is a 39 y.o. female who presents for No chief complaint on file..  HPI    Review of Systems    Objective   Physical Exam    Assessment/Plan              .VS

## 2024-09-09 ENCOUNTER — OFFICE VISIT (OUTPATIENT)
Dept: PRIMARY CARE | Facility: CLINIC | Age: 39
End: 2024-09-09
Payer: COMMERCIAL

## 2024-09-09 VITALS
SYSTOLIC BLOOD PRESSURE: 140 MMHG | WEIGHT: 203 LBS | RESPIRATION RATE: 16 BRPM | HEART RATE: 77 BPM | BODY MASS INDEX: 38.36 KG/M2 | TEMPERATURE: 97.5 F | DIASTOLIC BLOOD PRESSURE: 85 MMHG

## 2024-09-09 DIAGNOSIS — L03.114 CELLULITIS OF LEFT UPPER EXTREMITY: Primary | ICD-10-CM

## 2024-09-09 PROCEDURE — 1036F TOBACCO NON-USER: CPT | Performed by: FAMILY MEDICINE

## 2024-09-09 PROCEDURE — 87205 SMEAR GRAM STAIN: CPT

## 2024-09-09 PROCEDURE — 87075 CULTR BACTERIA EXCEPT BLOOD: CPT

## 2024-09-09 PROCEDURE — 99213 OFFICE O/P EST LOW 20 MIN: CPT | Performed by: FAMILY MEDICINE

## 2024-09-09 PROCEDURE — 87070 CULTURE OTHR SPECIMN AEROBIC: CPT

## 2024-09-09 NOTE — PROGRESS NOTES
Subjective   Patient ID: Yvonne Echeverria is a 39 y.o. female who presents for Follow-up (Possible cellulitis on left arm /Started amoxicillin ).  HPI  ollow-up (Possible cellulitis on left arm /Started amoxicillin ).    NO HX OF MRSA.        Review of Systems    Objective   Physical Exam    Assessment/Plan              .VS

## 2024-09-09 NOTE — PATIENT INSTRUCTIONS
USE [employer].  CALL FOR NEEDS [phone].   KEEP ON MEDICATIONS  KEEP SPECIALTY APPOINTMENTS.   CALL IF REDNESS PAIN OR STREAKING OR PUSS.

## 2024-09-12 LAB
BACTERIA SPEC CULT: NORMAL
GRAM STN SPEC: NORMAL
GRAM STN SPEC: NORMAL

## 2024-10-01 ENCOUNTER — APPOINTMENT (OUTPATIENT)
Dept: PRIMARY CARE | Facility: CLINIC | Age: 39
End: 2024-10-01
Payer: COMMERCIAL

## 2024-12-03 ENCOUNTER — OFFICE VISIT (OUTPATIENT)
Dept: PRIMARY CARE | Facility: CLINIC | Age: 39
End: 2024-12-03
Payer: COMMERCIAL

## 2024-12-03 VITALS
HEIGHT: 61 IN | WEIGHT: 203 LBS | SYSTOLIC BLOOD PRESSURE: 112 MMHG | DIASTOLIC BLOOD PRESSURE: 70 MMHG | HEART RATE: 93 BPM | TEMPERATURE: 97.6 F | RESPIRATION RATE: 16 BRPM | BODY MASS INDEX: 38.33 KG/M2

## 2024-12-03 DIAGNOSIS — Z79.3 LONG TERM CURRENT USE OF HORMONAL CONTRACEPTIVE: ICD-10-CM

## 2024-12-03 DIAGNOSIS — R22.0 LIP SWELLING: ICD-10-CM

## 2024-12-03 DIAGNOSIS — E10.42 TYPE 1 DIABETES MELLITUS WITH POLYNEUROPATHY: ICD-10-CM

## 2024-12-03 DIAGNOSIS — E66.812 CLASS 2 SEVERE OBESITY DUE TO EXCESS CALORIES WITH SERIOUS COMORBIDITY AND BODY MASS INDEX (BMI) OF 38.0 TO 38.9 IN ADULT: ICD-10-CM

## 2024-12-03 DIAGNOSIS — E10.9 TYPE 1 DIABETES MELLITUS ON INSULIN THERAPY (MULTI): ICD-10-CM

## 2024-12-03 DIAGNOSIS — F41.9 ANXIETY: ICD-10-CM

## 2024-12-03 DIAGNOSIS — Z00.01 ENCOUNTER FOR GENERAL ADULT MEDICAL EXAMINATION WITH ABNORMAL FINDINGS: Primary | ICD-10-CM

## 2024-12-03 DIAGNOSIS — E66.01 CLASS 2 SEVERE OBESITY DUE TO EXCESS CALORIES WITH SERIOUS COMORBIDITY AND BODY MASS INDEX (BMI) OF 38.0 TO 38.9 IN ADULT: ICD-10-CM

## 2024-12-03 DIAGNOSIS — R06.83 SNORING: ICD-10-CM

## 2024-12-03 DIAGNOSIS — E03.9 HYPOTHYROIDISM, UNSPECIFIED TYPE: ICD-10-CM

## 2024-12-03 PROCEDURE — 3078F DIAST BP <80 MM HG: CPT | Performed by: FAMILY MEDICINE

## 2024-12-03 PROCEDURE — 3074F SYST BP LT 130 MM HG: CPT | Performed by: FAMILY MEDICINE

## 2024-12-03 PROCEDURE — 3008F BODY MASS INDEX DOCD: CPT | Performed by: FAMILY MEDICINE

## 2024-12-03 PROCEDURE — 1036F TOBACCO NON-USER: CPT | Performed by: FAMILY MEDICINE

## 2024-12-03 PROCEDURE — 99395 PREV VISIT EST AGE 18-39: CPT | Performed by: FAMILY MEDICINE

## 2024-12-03 RX ORDER — ESCITALOPRAM OXALATE 10 MG/1
10 TABLET ORAL DAILY
Qty: 90 TABLET | Refills: 3 | Status: SHIPPED | OUTPATIENT
Start: 2024-12-03

## 2024-12-03 RX ORDER — BLOOD-GLUCOSE TRANSMITTER
EACH MISCELLANEOUS AS NEEDED
COMMUNITY
Start: 2024-11-05 | End: 2024-12-03 | Stop reason: SDUPTHER

## 2024-12-03 RX ORDER — ETONOGESTREL AND ETHINYL ESTRADIOL VAGINAL RING .015; .12 MG/D; MG/D
1 RING VAGINAL
Qty: 1 EACH | Refills: 12 | Status: SHIPPED | OUTPATIENT
Start: 2024-12-03 | End: 2025-12-03

## 2024-12-03 RX ORDER — BLOOD-GLUCOSE TRANSMITTER
EACH MISCELLANEOUS
Qty: 1 EACH | Refills: 11 | Status: SHIPPED | OUTPATIENT
Start: 2024-12-03

## 2024-12-03 RX ORDER — CETIRIZINE HYDROCHLORIDE 10 MG/1
10 TABLET ORAL DAILY
Qty: 30 TABLET | Refills: 2 | Status: SHIPPED | OUTPATIENT
Start: 2024-12-03 | End: 2025-03-03

## 2024-12-03 RX ORDER — BLOOD-GLUCOSE SENSOR
EACH MISCELLANEOUS
Qty: 9 EACH | Refills: 3 | Status: SHIPPED | OUTPATIENT
Start: 2024-12-03

## 2024-12-03 RX ORDER — INSULIN LISPRO 100 [IU]/ML
INJECTION, SOLUTION INTRAVENOUS; SUBCUTANEOUS
Qty: 90 ML | Refills: 3 | Status: SHIPPED | OUTPATIENT
Start: 2024-12-03

## 2024-12-03 RX ORDER — LEVOTHYROXINE SODIUM 112 UG/1
112 TABLET ORAL DAILY
Qty: 90 TABLET | Refills: 3 | Status: SHIPPED | OUTPATIENT
Start: 2024-12-03 | End: 2025-12-03

## 2024-12-03 NOTE — PROGRESS NOTES
"Subjective   Patient ID: Yvonne Echeverria is a 39 y.o. female who presents for Facial Swelling (LIP SWELLING ONLY ON RT SIDE STARTED THIS MORNING /TOOK IBUPROFEN AND ZYRTEC AND CLARITIN ).    HPI   Facial Swelling (LIP SWELLING ONLY ON RT SIDE STARTED THIS MORNING /TOOK IBUPROFEN AND ZYRTEC AND CLARITIN ).    NO HX OF TRAUMA NO BITES NO HX OF HERPES COLD SORES.      PT HAD LIP INJECTIONS 1 YR AGO.    PT STATES THE FILLER IS GONE BY NOW.    TOOK ICE TOPICALLY COMPRESSED AND ZYRTEC AND CLARITIN AND KNOWS SEDATION COMES WITH BENADRYL.      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  PT SAYS SHE CAN TOLERATE N95 USE BUT WORK HAS PAPERS.  PLEASE BRING PAPERS FOR PCP TO REVIEW.      WITNESSED SNORING AND BREATH HOLDING EPISODES IN SLEEP  STOP BANG    AIC 6%      Review of Systems   All other systems reviewed and are negative.      Objective   /70   Pulse 93   Temp 36.4 °C (97.6 °F)   Resp 16   Ht 1.549 m (5' 1\")   Wt 92.1 kg (203 lb)   BMI 38.36 kg/m²     Physical Exam  Vitals and nursing note reviewed.   Constitutional:       Appearance: Normal appearance.      Comments: PT SPEAKS FREELY AND OBESE AND SWOLLEN LOWER LIP RT> LEFT AND PHOTO ON PHONE REVIEWED MUCH LARGER.  SOME FULLNESS AT THROAT AND 41 CM CIRCUMFERENCE.  PUMP AND SENSOR AT LLQ ABOVE BELT LINE NO REDNESS TO SKIN.     HENT:      Head: Normocephalic.      Right Ear: Tympanic membrane, ear canal and external ear normal.      Left Ear: Tympanic membrane, ear canal and external ear normal.      Nose: Nose normal.      Mouth/Throat:      Mouth: Mucous membranes are moist.      Pharynx: Oropharynx is clear.   Eyes:      Conjunctiva/sclera: Conjunctivae normal.      Pupils: Pupils are equal, round, and reactive to light.   Cardiovascular:      Rate and Rhythm: Normal rate and regular rhythm.      Pulses: Normal pulses.      Heart sounds: Normal heart sounds.   Pulmonary:      Effort: Pulmonary effort is normal.      Breath sounds: Normal " breath sounds.   Abdominal:      General: Bowel sounds are normal.      Palpations: Abdomen is soft.   Musculoskeletal:         General: Normal range of motion.      Cervical back: Normal range of motion and neck supple.   Skin:     General: Skin is warm and dry.   Neurological:      General: No focal deficit present.      Mental Status: Mental status is at baseline.   Psychiatric:         Mood and Affect: Mood normal.         Behavior: Behavior normal.         Thought Content: Thought content normal.         Judgment: Judgment normal.         Assessment/Plan   Assessment & Plan  Lip swelling    Orders:    cetirizine (ZyrTEC) 10 mg tablet; Take 1 tablet (10 mg) by mouth once daily.    Anxiety    Orders:    escitalopram (Lexapro) 10 mg tablet; Take 1 tablet (10 mg) by mouth once daily.    Type 1 diabetes mellitus on insulin therapy (Multi)    Orders:    glucagon 0.5 mg/0.1 mL auto-injector; Inject 0.1 mL under the skin if needed (TO REVERSE SEVERE HYPOGLYCEMIC REACTIONS ON INSULIN PUMP THERAPY.).    insulin lispro (HumaLOG U-100 Insulin) 100 unit/mL injection; Take as directed per insulin instructions.  UP  UNITS DAILY. INSULIN PUMP.    Dexcom G6 Transmitter device; USE DEXCOM TO MANAGE INSULIN TREATED DIABETES WITH OMNIPOD INSULIN PUMP.  CHANGE EVERY 1 UNIT FOR 90 DAYS.    Dexcom G6 Sensor device; #9 FOR 90 DAYS REFILL 1 YR:  TO MANAGE INSULIN TREATED DIABETES.    Vitamin B12; Future    Referral to Ophthalmology; Future    Tissue transglutaminase, IgA; Future    Lipid panel; Future    Vitamin D 25-Hydroxy,Total (for eval of Vitamin D levels); Future    Urinalysis with Reflex Microscopic; Future    TSH with reflex to Free T4 if abnormal; Future    Comprehensive Metabolic Panel; Future    CBC and Auto Differential; Future    Follow Up In Primary Care - Established; Future    Hypothyroidism, unspecified type    Orders:    levothyroxine (Synthroid) 112 mcg tablet; Take 1 tablet (112 mcg) by mouth early in the  morning.. 1 by mouth every day: 1.5 On Sumdays LT4 112/D -Increased By DR Justin Dailey To 1.5 Tabs/Sunday    TSH with reflex to Free T4 if abnormal; Future    Long term current use of hormonal contraceptive    Orders:    etonogestreL-ethinyl estradioL (NuvaRing) 0.12-0.015 mg/24 hr vaginal ring; Insert 1 each into the vagina every 28 (twenty-eight) days.  for 3 weeks then remove for 1 week then repeat    Encounter for general adult medical examination with abnormal findings    Orders:    Hepatitis C antibody; Future    Lipid panel; Future    Vitamin D 25-Hydroxy,Total (for eval of Vitamin D levels); Future    Urinalysis with Reflex Microscopic; Future    TSH with reflex to Free T4 if abnormal; Future    Comprehensive Metabolic Panel; Future    CBC and Auto Differential; Future    Follow Up In Primary Care - Health Maintenance; Future    Follow Up In Primary Care - Established; Future    Type 1 diabetes mellitus with polyneuropathy         Snoring    Orders:    In-Center Sleep Study (Non-Sleep Provider Only); Future    Class 2 severe obesity due to excess calories with serious comorbidity and body mass index (BMI) of 38.0 to 38.9 in adult

## 2024-12-03 NOTE — PATIENT INSTRUCTIONS
USE [employer].  CALL FOR NEEDS [phone].   KEEP ON MEDICATIONS  KEEP SPECIALTY APPOINTMENTS.    KEEP EYE CARE APPTS WITH DR COLVIN.    GET FASTED LABS COMPLETED.    AVOID ANY TRIGGERS FOR SWELLING.    CALL 911 FOR ANY BREATHING DIFFICULTIES - EVER - IMMEDIATELY.    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  PT SAYS SHE CAN TOLERATE N95 USE BUT WORK HAS PAPERS.  PLEASE BRING PAPERS FOR PCP TO REVIEW.    IN SLEEP CENTER SLEEP STUDY FOR WITNESSED SNORING AND BREATH HOLDING EPISODES IN SLEEP  STOP BAN.   AVOID ANY TRIGGERS AND TREAT SYMPTOMS OF SWELLING AGGRESSIVELY.    YOU MAY NEED ALLERGY TESTING.

## 2025-02-08 ENCOUNTER — CLINICAL SUPPORT (OUTPATIENT)
Dept: SLEEP MEDICINE | Facility: CLINIC | Age: 40
End: 2025-02-08
Payer: COMMERCIAL

## 2025-02-08 VITALS
DIASTOLIC BLOOD PRESSURE: 65 MMHG | WEIGHT: 202.6 LBS | HEIGHT: 61 IN | OXYGEN SATURATION: 98 % | RESPIRATION RATE: 19 BRPM | SYSTOLIC BLOOD PRESSURE: 103 MMHG | HEART RATE: 77 BPM | BODY MASS INDEX: 38.25 KG/M2

## 2025-02-08 DIAGNOSIS — G47.33 OBSTRUCTIVE SLEEP APNEA (ADULT) (PEDIATRIC): ICD-10-CM

## 2025-02-08 DIAGNOSIS — R06.83 SNORING: ICD-10-CM

## 2025-02-08 PROCEDURE — 95810 POLYSOM 6/> YRS 4/> PARAM: CPT | Performed by: STUDENT IN AN ORGANIZED HEALTH CARE EDUCATION/TRAINING PROGRAM

## 2025-02-09 NOTE — PROGRESS NOTES
[facility] TECH NOTE:     Patient: Yvonne Echeverria   MRN//AGE: [id number]  1985  39 y.o.   Technologist: RIVKA GRANGER   Room: 441B   Service Date: 2025        Sleep Testing Location: [facility]:     TECHNOLOGIST SLEEP STUDY PROCEDURE NOTE:   This sleep study is being conducted according to the policies and procedures outlined by the [employer]AS accreditation standards.  The sleep study procedure and processes involved during this appointment was explained to the patient/patient’s family, questions were answered. The patient/family verbalized understanding.      The patient is a 39 y.o. year old female scheduled for aDiagnostic PSG Split night with montage of:   PSG Master shared . she arrived for her appointment.      The study that was ultimately completed was a Diagnostic PSG  with montage of:    PSG Master shared .    The full study Was completed.  Patient questionnaires completed?: yes     Consents signed? yes    Initial Fall Risk Screening:     Yvonne has not fallen in the last 6 months. her did not result in injury. Yvonne does not have a fear of falling. She does not need assistance with sitting, standing, or walking. she does not need assistance walking in her home. she does not need assistance in an unfamiliar setting. The patient is notusing an assistive device.     Brief Study observations: Patient did not reach criteria for split night study w/3% rule of scoring. She presented today with loud continuous/intermittent snoring, respiratory events, O2 desaturations, only a few arousals, and some prolonged awakenings.    Deviation to order/protocol and reason: N/A      If PAP, which was preferred mask/pressure/mode: N/A      Other:None    After the procedure, the patient/family was informed to ensure followup with ordering clinician for testing results.      Technologist: RIVKA GRANGER

## 2025-02-17 DIAGNOSIS — G47.33 OSA (OBSTRUCTIVE SLEEP APNEA): Primary | ICD-10-CM

## 2025-03-29 LAB
25(OH)D3+25(OH)D2 SERPL-MCNC: 26 NG/ML (ref 30–100)
ALBUMIN SERPL-MCNC: 4 G/DL (ref 3.6–5.1)
ALP SERPL-CCNC: 42 U/L (ref 31–125)
ALT SERPL-CCNC: 12 U/L (ref 6–29)
ANION GAP SERPL CALCULATED.4IONS-SCNC: 6 MMOL/L (CALC) (ref 7–17)
AST SERPL-CCNC: 14 U/L (ref 10–30)
BASOPHILS # BLD AUTO: 78 CELLS/UL (ref 0–200)
BASOPHILS NFR BLD AUTO: 1.4 %
BILIRUB SERPL-MCNC: 0.4 MG/DL (ref 0.2–1.2)
BUN SERPL-MCNC: 12 MG/DL (ref 7–25)
CALCIUM SERPL-MCNC: 9.3 MG/DL (ref 8.6–10.2)
CHLORIDE SERPL-SCNC: 105 MMOL/L (ref 98–110)
CHOLEST SERPL-MCNC: 209 MG/DL
CHOLEST/HDLC SERPL: 3.2 (CALC)
CO2 SERPL-SCNC: 26 MMOL/L (ref 20–32)
CREAT SERPL-MCNC: 0.52 MG/DL (ref 0.5–0.97)
EGFRCR SERPLBLD CKD-EPI 2021: 121 ML/MIN/1.73M2
EOSINOPHIL # BLD AUTO: 112 CELLS/UL (ref 15–500)
EOSINOPHIL NFR BLD AUTO: 2 %
ERYTHROCYTE [DISTWIDTH] IN BLOOD BY AUTOMATED COUNT: 12.3 % (ref 11–15)
GLUCOSE SERPL-MCNC: 105 MG/DL (ref 65–139)
HCT VFR BLD AUTO: 42.2 % (ref 35–45)
HCV AB SERPL QL IA: NORMAL
HDLC SERPL-MCNC: 65 MG/DL
HGB BLD-MCNC: 13.7 G/DL (ref 11.7–15.5)
LDLC SERPL CALC-MCNC: 131 MG/DL (CALC)
LYMPHOCYTES # BLD AUTO: 1736 CELLS/UL (ref 850–3900)
LYMPHOCYTES NFR BLD AUTO: 31 %
MCH RBC QN AUTO: 28.1 PG (ref 27–33)
MCHC RBC AUTO-ENTMCNC: 32.5 G/DL (ref 32–36)
MCV RBC AUTO: 86.7 FL (ref 80–100)
MONOCYTES # BLD AUTO: 409 CELLS/UL (ref 200–950)
MONOCYTES NFR BLD AUTO: 7.3 %
NEUTROPHILS # BLD AUTO: 3265 CELLS/UL (ref 1500–7800)
NEUTROPHILS NFR BLD AUTO: 58.3 %
NONHDLC SERPL-MCNC: 144 MG/DL (CALC)
PLATELET # BLD AUTO: 323 THOUSAND/UL (ref 140–400)
PMV BLD REES-ECKER: 11.1 FL (ref 7.5–12.5)
POTASSIUM SERPL-SCNC: 4.1 MMOL/L (ref 3.5–5.3)
PROT SERPL-MCNC: 6.8 G/DL (ref 6.1–8.1)
RBC # BLD AUTO: 4.87 MILLION/UL (ref 3.8–5.1)
SODIUM SERPL-SCNC: 137 MMOL/L (ref 135–146)
TRIGL SERPL-MCNC: 44 MG/DL
TSH SERPL-ACNC: 0.84 MIU/L
TTG IGA SER-ACNC: NORMAL
VIT B12 SERPL-MCNC: 370 PG/ML (ref 200–1100)
WBC # BLD AUTO: 5.6 THOUSAND/UL (ref 3.8–10.8)

## 2025-03-31 DIAGNOSIS — E55.9 VITAMIN D DEFICIENCY: Primary | ICD-10-CM

## 2025-03-31 DIAGNOSIS — E78.5 HYPERLIPIDEMIA, UNSPECIFIED HYPERLIPIDEMIA TYPE: ICD-10-CM

## 2025-03-31 DIAGNOSIS — E10.9 TYPE 1 DIABETES MELLITUS ON INSULIN THERAPY (MULTI): ICD-10-CM

## 2025-03-31 LAB
25(OH)D3+25(OH)D2 SERPL-MCNC: 26 NG/ML (ref 30–100)
ALBUMIN SERPL-MCNC: 4 G/DL (ref 3.6–5.1)
ALP SERPL-CCNC: 42 U/L (ref 31–125)
ALT SERPL-CCNC: 12 U/L (ref 6–29)
ANION GAP SERPL CALCULATED.4IONS-SCNC: 6 MMOL/L (CALC) (ref 7–17)
AST SERPL-CCNC: 14 U/L (ref 10–30)
BASOPHILS # BLD AUTO: 78 CELLS/UL (ref 0–200)
BASOPHILS NFR BLD AUTO: 1.4 %
BILIRUB SERPL-MCNC: 0.4 MG/DL (ref 0.2–1.2)
BUN SERPL-MCNC: 12 MG/DL (ref 7–25)
CALCIUM SERPL-MCNC: 9.3 MG/DL (ref 8.6–10.2)
CHLORIDE SERPL-SCNC: 105 MMOL/L (ref 98–110)
CHOLEST SERPL-MCNC: 209 MG/DL
CHOLEST/HDLC SERPL: 3.2 (CALC)
CO2 SERPL-SCNC: 26 MMOL/L (ref 20–32)
CREAT SERPL-MCNC: 0.52 MG/DL (ref 0.5–0.97)
EGFRCR SERPLBLD CKD-EPI 2021: 121 ML/MIN/1.73M2
EOSINOPHIL # BLD AUTO: 112 CELLS/UL (ref 15–500)
EOSINOPHIL NFR BLD AUTO: 2 %
ERYTHROCYTE [DISTWIDTH] IN BLOOD BY AUTOMATED COUNT: 12.3 % (ref 11–15)
GLUCOSE SERPL-MCNC: 105 MG/DL (ref 65–139)
HCT VFR BLD AUTO: 42.2 % (ref 35–45)
HCV AB SERPL QL IA: NORMAL
HDLC SERPL-MCNC: 65 MG/DL
HGB BLD-MCNC: 13.7 G/DL (ref 11.7–15.5)
LDLC SERPL CALC-MCNC: 131 MG/DL (CALC)
LYMPHOCYTES # BLD AUTO: 1736 CELLS/UL (ref 850–3900)
LYMPHOCYTES NFR BLD AUTO: 31 %
MCH RBC QN AUTO: 28.1 PG (ref 27–33)
MCHC RBC AUTO-ENTMCNC: 32.5 G/DL (ref 32–36)
MCV RBC AUTO: 86.7 FL (ref 80–100)
MONOCYTES # BLD AUTO: 409 CELLS/UL (ref 200–950)
MONOCYTES NFR BLD AUTO: 7.3 %
NEUTROPHILS # BLD AUTO: 3265 CELLS/UL (ref 1500–7800)
NEUTROPHILS NFR BLD AUTO: 58.3 %
NONHDLC SERPL-MCNC: 144 MG/DL (CALC)
PLATELET # BLD AUTO: 323 THOUSAND/UL (ref 140–400)
PMV BLD REES-ECKER: 11.1 FL (ref 7.5–12.5)
POTASSIUM SERPL-SCNC: 4.1 MMOL/L (ref 3.5–5.3)
PROT SERPL-MCNC: 6.8 G/DL (ref 6.1–8.1)
RBC # BLD AUTO: 4.87 MILLION/UL (ref 3.8–5.1)
SODIUM SERPL-SCNC: 137 MMOL/L (ref 135–146)
TRIGL SERPL-MCNC: 44 MG/DL
TSH SERPL-ACNC: 0.84 MIU/L
TTG IGA SER-ACNC: <1 U/ML
VIT B12 SERPL-MCNC: 370 PG/ML (ref 200–1100)
WBC # BLD AUTO: 5.6 THOUSAND/UL (ref 3.8–10.8)

## 2025-03-31 RX ORDER — CHOLECALCIFEROL (VITAMIN D3) 25 MCG
1000 TABLET ORAL DAILY
Qty: 30 TABLET | Refills: 11 | Status: SHIPPED | OUTPATIENT
Start: 2025-03-31 | End: 2026-03-31

## 2025-03-31 RX ORDER — ATORVASTATIN CALCIUM 10 MG/1
10 TABLET, FILM COATED ORAL DAILY
Qty: 100 TABLET | Refills: 3 | Status: SHIPPED | OUTPATIENT
Start: 2025-03-31 | End: 2026-05-05

## 2025-05-07 ENCOUNTER — APPOINTMENT (OUTPATIENT)
Dept: PRIMARY CARE | Facility: CLINIC | Age: 40
End: 2025-05-07
Payer: COMMERCIAL

## 2025-05-19 ENCOUNTER — APPOINTMENT (OUTPATIENT)
Dept: PRIMARY CARE | Facility: CLINIC | Age: 40
End: 2025-05-19
Payer: COMMERCIAL

## 2025-05-30 ENCOUNTER — APPOINTMENT (OUTPATIENT)
Dept: PRIMARY CARE | Facility: CLINIC | Age: 40
End: 2025-05-30
Payer: COMMERCIAL

## 2025-06-04 ENCOUNTER — APPOINTMENT (OUTPATIENT)
Dept: PRIMARY CARE | Facility: CLINIC | Age: 40
End: 2025-06-04
Payer: COMMERCIAL

## 2025-06-04 VITALS
TEMPERATURE: 97.7 F | HEIGHT: 61 IN | HEART RATE: 84 BPM | BODY MASS INDEX: 37.76 KG/M2 | DIASTOLIC BLOOD PRESSURE: 82 MMHG | SYSTOLIC BLOOD PRESSURE: 130 MMHG | RESPIRATION RATE: 16 BRPM | WEIGHT: 200 LBS

## 2025-06-04 DIAGNOSIS — E10.42 TYPE 1 DIABETES MELLITUS WITH POLYNEUROPATHY: ICD-10-CM

## 2025-06-04 DIAGNOSIS — G47.33 OSA (OBSTRUCTIVE SLEEP APNEA): Primary | ICD-10-CM

## 2025-06-04 DIAGNOSIS — E10.9 TYPE 1 DIABETES MELLITUS ON INSULIN THERAPY (MULTI): ICD-10-CM

## 2025-06-04 DIAGNOSIS — Z79.3 LONG TERM CURRENT USE OF HORMONAL CONTRACEPTIVE: ICD-10-CM

## 2025-06-04 DIAGNOSIS — R06.83 SNORING: ICD-10-CM

## 2025-06-04 PROCEDURE — 3079F DIAST BP 80-89 MM HG: CPT | Performed by: FAMILY MEDICINE

## 2025-06-04 PROCEDURE — 3008F BODY MASS INDEX DOCD: CPT | Performed by: FAMILY MEDICINE

## 2025-06-04 PROCEDURE — 99214 OFFICE O/P EST MOD 30 MIN: CPT | Performed by: FAMILY MEDICINE

## 2025-06-04 PROCEDURE — 3075F SYST BP GE 130 - 139MM HG: CPT | Performed by: FAMILY MEDICINE

## 2025-06-04 PROCEDURE — 1036F TOBACCO NON-USER: CPT | Performed by: FAMILY MEDICINE

## 2025-06-04 RX ORDER — FLUTICASONE PROPIONATE 50 MCG
1 SPRAY, SUSPENSION (ML) NASAL DAILY
Qty: 16 G | Refills: 11 | Status: SHIPPED | OUTPATIENT
Start: 2025-06-04 | End: 2026-06-04

## 2025-06-04 NOTE — PROGRESS NOTES
"Subjective   Patient ID: Yvonne Echeverria is a 39 y.o. female who presents for Follow-up (Needs face to face documentation for benefiting using CPAP machine ).    HPI    Follow-up (Needs face to face documentation for benefiting using CPAP machine ).   #.   ACTIVE HIKING EATS SALADS.    CRESTOR ADDED BY ENDO/  D3 25 NOW 50 ug DOSE AND THE MONTHLY DOSE IS AN OPTION.  BUT PT IS FORGETFUL.      PT HERE W/ HER DTR WHO IS IN [employer] BOOT RLE.      A1c 7.2% 2 M AGO IS CLOSE TO GOAL <7%.   DEXCOM G7 ONGOING.      PT ASKS IF SHE WILL NEED THE STATIN LIFELONG.  YES. DIET AND EXERCISE AND LIFESTYLE CHANGES WILL ALSO HELP.      EPISODE WITH DAD AT DOCTOR OFFICE SHOT IN HIS EYE TRIGGERED A FAINT IN PT:  FLUSHED LIGHT HEADED WEAK AS SHE STOOD UP.  Had you eaten?  Hydration?  PT IS A NURSE.      PT REPORTS GOOD COMPLIANCE WITH APAP USE AND BEDTIME TO 4 AM THEN OFTEN REMOVES DEVICE IN HER SLEEP.      NASAL VOICE.      PT WORKS AS NURSE WITH [employer].      Review of Systems   All other systems reviewed and are negative.  SOME SORE THROAT AND ALLERGIES AND SNEEZES SUPPRESSED AT VISIT TODAY.      Objective   /82   Pulse 84   Temp 36.5 °C (97.7 °F) (Temporal)   Resp 16   Ht 1.549 m (5' 1\")   Wt 90.7 kg (200 lb)   BMI 37.79 kg/m²     Physical Exam  Vitals and nursing note reviewed.   Constitutional:       Appearance: Normal appearance. She is obese.   HENT:      Head: Normocephalic.      Right Ear: Tympanic membrane, ear canal and external ear normal.      Left Ear: Tympanic membrane, ear canal and external ear normal.      Nose: Nose normal.      Mouth/Throat:      Mouth: Mucous membranes are moist.      Pharynx: Oropharynx is clear.   Eyes:      Conjunctiva/sclera: Conjunctivae normal.      Pupils: Pupils are equal, round, and reactive to light.   Cardiovascular:      Rate and Rhythm: Normal rate and regular rhythm.      Pulses: Normal pulses.      Heart sounds: Normal heart sounds.   Pulmonary:      " Effort: Pulmonary effort is normal.      Breath sounds: Normal breath sounds.   Abdominal:      General: Bowel sounds are normal.      Palpations: Abdomen is soft.   Musculoskeletal:         General: Normal range of motion.      Cervical back: Normal range of motion and neck supple.   Skin:     General: Skin is warm and dry.   Neurological:      General: No focal deficit present.      Mental Status: Mental status is at baseline.   Psychiatric:         Mood and Affect: Mood normal.         Behavior: Behavior normal.         Thought Content: Thought content normal.         Judgment: Judgment normal.         Assessment/Plan   Assessment & Plan  Type 1 diabetes mellitus on insulin therapy (Multi)    Orders:    Albumin-Creatinine Ratio, Urine Random; Future    Hemoglobin A1c; Future    Referral to Ophthalmology; Future    Follow Up In Primary Care - Established; Future    Follow Up In Primary Care - Established; Future    Long term current use of hormonal contraceptive    Orders:    Albumin-Creatinine Ratio, Urine Random; Future    Hemoglobin A1c; Future    Referral to Ophthalmology; Future    Follow Up In Primary Care - Established; Future    Follow Up In Primary Care - Established; Future    Snoring    Orders:    Positive Airway Pressure (PAP) Therapy    fluticasone (Flonase) 50 mcg/actuation nasal spray; Administer 1 spray into each nostril once daily. Shake gently. Before first use, prime pump. After use, clean tip and replace cap.    ALMA DELIA (obstructive sleep apnea)    Orders:    Positive Airway Pressure (PAP) Therapy    fluticasone (Flonase) 50 mcg/actuation nasal spray; Administer 1 spray into each nostril once daily. Shake gently. Before first use, prime pump. After use, clean tip and replace cap.    Follow Up In Primary Care - Established; Future